# Patient Record
Sex: FEMALE | Race: WHITE | NOT HISPANIC OR LATINO | URBAN - METROPOLITAN AREA
[De-identification: names, ages, dates, MRNs, and addresses within clinical notes are randomized per-mention and may not be internally consistent; named-entity substitution may affect disease eponyms.]

---

## 2019-08-14 ENCOUNTER — INPATIENT (INPATIENT)
Facility: HOSPITAL | Age: 74
LOS: 1 days | Discharge: HOME | DRG: 390 | End: 2019-08-16
Attending: SURGERY | Admitting: SURGERY
Payer: MEDICARE

## 2019-08-14 VITALS
HEART RATE: 74 BPM | SYSTOLIC BLOOD PRESSURE: 149 MMHG | DIASTOLIC BLOOD PRESSURE: 65 MMHG | RESPIRATION RATE: 25 BRPM | OXYGEN SATURATION: 100 %

## 2019-08-14 DIAGNOSIS — Z90.49 ACQUIRED ABSENCE OF OTHER SPECIFIED PARTS OF DIGESTIVE TRACT: Chronic | ICD-10-CM

## 2019-08-14 DIAGNOSIS — Z90.710 ACQUIRED ABSENCE OF BOTH CERVIX AND UTERUS: Chronic | ICD-10-CM

## 2019-08-14 LAB
ALBUMIN SERPL ELPH-MCNC: 3.6 G/DL — SIGNIFICANT CHANGE UP (ref 3.3–5)
ALBUMIN SERPL ELPH-MCNC: 4.2 G/DL — SIGNIFICANT CHANGE UP (ref 3.3–5)
ALP SERPL-CCNC: 55 U/L — SIGNIFICANT CHANGE UP (ref 40–120)
ALP SERPL-CCNC: 68 U/L — SIGNIFICANT CHANGE UP (ref 40–120)
ALT FLD-CCNC: 20 U/L — SIGNIFICANT CHANGE UP (ref 10–45)
ALT FLD-CCNC: SIGNIFICANT CHANGE UP U/L (ref 10–45)
ANION GAP SERPL CALC-SCNC: 11 MMOL/L — SIGNIFICANT CHANGE UP (ref 5–17)
ANION GAP SERPL CALC-SCNC: 14 MMOL/L — SIGNIFICANT CHANGE UP (ref 5–17)
APPEARANCE UR: CLEAR — SIGNIFICANT CHANGE UP
AST SERPL-CCNC: 25 U/L — SIGNIFICANT CHANGE UP (ref 10–40)
AST SERPL-CCNC: SIGNIFICANT CHANGE UP U/L (ref 10–40)
BASOPHILS # BLD AUTO: 0.02 K/UL — SIGNIFICANT CHANGE UP (ref 0–0.2)
BASOPHILS NFR BLD AUTO: 0.2 % — SIGNIFICANT CHANGE UP (ref 0–2)
BILIRUB SERPL-MCNC: 0.3 MG/DL — SIGNIFICANT CHANGE UP (ref 0.2–1.2)
BILIRUB SERPL-MCNC: 0.4 MG/DL — SIGNIFICANT CHANGE UP (ref 0.2–1.2)
BILIRUB UR-MCNC: NEGATIVE — SIGNIFICANT CHANGE UP
BUN SERPL-MCNC: 19 MG/DL — SIGNIFICANT CHANGE UP (ref 7–23)
BUN SERPL-MCNC: 21 MG/DL — SIGNIFICANT CHANGE UP (ref 7–23)
CALCIUM SERPL-MCNC: 8.6 MG/DL — SIGNIFICANT CHANGE UP (ref 8.4–10.5)
CALCIUM SERPL-MCNC: 9.6 MG/DL — SIGNIFICANT CHANGE UP (ref 8.4–10.5)
CHLORIDE SERPL-SCNC: 101 MMOL/L — SIGNIFICANT CHANGE UP (ref 96–108)
CHLORIDE SERPL-SCNC: 102 MMOL/L — SIGNIFICANT CHANGE UP (ref 96–108)
CO2 SERPL-SCNC: 28 MMOL/L — SIGNIFICANT CHANGE UP (ref 22–31)
CO2 SERPL-SCNC: 30 MMOL/L — SIGNIFICANT CHANGE UP (ref 22–31)
COLOR SPEC: YELLOW — SIGNIFICANT CHANGE UP
CREAT SERPL-MCNC: 0.62 MG/DL — SIGNIFICANT CHANGE UP (ref 0.5–1.3)
CREAT SERPL-MCNC: 0.63 MG/DL — SIGNIFICANT CHANGE UP (ref 0.5–1.3)
DIFF PNL FLD: NEGATIVE — SIGNIFICANT CHANGE UP
EOSINOPHIL # BLD AUTO: 0.45 K/UL — SIGNIFICANT CHANGE UP (ref 0–0.5)
EOSINOPHIL NFR BLD AUTO: 3.5 % — SIGNIFICANT CHANGE UP (ref 0–6)
GLUCOSE SERPL-MCNC: 106 MG/DL — HIGH (ref 70–99)
GLUCOSE SERPL-MCNC: 124 MG/DL — HIGH (ref 70–99)
GLUCOSE UR QL: NEGATIVE — SIGNIFICANT CHANGE UP
HCT VFR BLD CALC: 42.9 % — SIGNIFICANT CHANGE UP (ref 34.5–45)
HGB BLD-MCNC: 14 G/DL — SIGNIFICANT CHANGE UP (ref 11.5–15.5)
IMM GRANULOCYTES NFR BLD AUTO: 0.5 % — SIGNIFICANT CHANGE UP (ref 0–1.5)
KETONES UR-MCNC: 15 MG/DL
LACTATE SERPL-SCNC: 1.9 MMOL/L — SIGNIFICANT CHANGE UP (ref 0.5–2)
LEUKOCYTE ESTERASE UR-ACNC: NEGATIVE — SIGNIFICANT CHANGE UP
LIDOCAIN IGE QN: 14 U/L — SIGNIFICANT CHANGE UP (ref 7–60)
LYMPHOCYTES # BLD AUTO: 0.76 K/UL — LOW (ref 1–3.3)
LYMPHOCYTES # BLD AUTO: 5.9 % — LOW (ref 13–44)
MCHC RBC-ENTMCNC: 30 PG — SIGNIFICANT CHANGE UP (ref 27–34)
MCHC RBC-ENTMCNC: 32.6 GM/DL — SIGNIFICANT CHANGE UP (ref 32–36)
MCV RBC AUTO: 91.9 FL — SIGNIFICANT CHANGE UP (ref 80–100)
MONOCYTES # BLD AUTO: 0.74 K/UL — SIGNIFICANT CHANGE UP (ref 0–0.9)
MONOCYTES NFR BLD AUTO: 5.7 % — SIGNIFICANT CHANGE UP (ref 2–14)
NEUTROPHILS # BLD AUTO: 10.89 K/UL — HIGH (ref 1.8–7.4)
NEUTROPHILS NFR BLD AUTO: 84.2 % — HIGH (ref 43–77)
NITRITE UR-MCNC: NEGATIVE — SIGNIFICANT CHANGE UP
NRBC # BLD: 0 /100 WBCS — SIGNIFICANT CHANGE UP (ref 0–0)
PH UR: 8.5 — HIGH (ref 5–8)
PLATELET # BLD AUTO: 261 K/UL — SIGNIFICANT CHANGE UP (ref 150–400)
POTASSIUM SERPL-MCNC: 3.9 MMOL/L — SIGNIFICANT CHANGE UP (ref 3.5–5.3)
POTASSIUM SERPL-MCNC: SIGNIFICANT CHANGE UP MMOL/L (ref 3.5–5.3)
POTASSIUM SERPL-SCNC: 3.9 MMOL/L — SIGNIFICANT CHANGE UP (ref 3.5–5.3)
POTASSIUM SERPL-SCNC: SIGNIFICANT CHANGE UP MMOL/L (ref 3.5–5.3)
PROT SERPL-MCNC: 5.8 G/DL — LOW (ref 6–8.3)
PROT SERPL-MCNC: 6.8 G/DL — SIGNIFICANT CHANGE UP (ref 6–8.3)
PROT UR-MCNC: 30 MG/DL
RBC # BLD: 4.67 M/UL — SIGNIFICANT CHANGE UP (ref 3.8–5.2)
RBC # FLD: 13.6 % — SIGNIFICANT CHANGE UP (ref 10.3–14.5)
SODIUM SERPL-SCNC: 143 MMOL/L — SIGNIFICANT CHANGE UP (ref 135–145)
SODIUM SERPL-SCNC: 143 MMOL/L — SIGNIFICANT CHANGE UP (ref 135–145)
SP GR SPEC: 1.01 — SIGNIFICANT CHANGE UP (ref 1–1.03)
UROBILINOGEN FLD QL: 0.2 E.U./DL — SIGNIFICANT CHANGE UP
WBC # BLD: 12.93 K/UL — HIGH (ref 3.8–10.5)
WBC # FLD AUTO: 12.93 K/UL — HIGH (ref 3.8–10.5)

## 2019-08-14 PROCEDURE — 99291 CRITICAL CARE FIRST HOUR: CPT | Mod: 25

## 2019-08-14 PROCEDURE — 71045 X-RAY EXAM CHEST 1 VIEW: CPT | Mod: 26

## 2019-08-14 PROCEDURE — 74019 RADEX ABDOMEN 2 VIEWS: CPT | Mod: 26

## 2019-08-14 PROCEDURE — 74177 CT ABD & PELVIS W/CONTRAST: CPT | Mod: 26

## 2019-08-14 RX ORDER — ONDANSETRON 8 MG/1
4 TABLET, FILM COATED ORAL ONCE
Refills: 0 | Status: DISCONTINUED | OUTPATIENT
Start: 2019-08-14 | End: 2019-08-14

## 2019-08-14 RX ORDER — SODIUM CHLORIDE 9 MG/ML
1000 INJECTION, SOLUTION INTRAVENOUS
Refills: 0 | Status: DISCONTINUED | OUTPATIENT
Start: 2019-08-14 | End: 2019-08-16

## 2019-08-14 RX ORDER — ONDANSETRON 8 MG/1
4 TABLET, FILM COATED ORAL ONCE
Refills: 0 | Status: COMPLETED | OUTPATIENT
Start: 2019-08-14 | End: 2019-08-14

## 2019-08-14 RX ORDER — RANITIDINE HYDROCHLORIDE 150 MG/1
0 TABLET, FILM COATED ORAL
Qty: 0 | Refills: 0 | DISCHARGE

## 2019-08-14 RX ORDER — METOCLOPRAMIDE HCL 10 MG
10 TABLET ORAL ONCE
Refills: 0 | Status: COMPLETED | OUTPATIENT
Start: 2019-08-14 | End: 2019-08-14

## 2019-08-14 RX ORDER — SODIUM CHLORIDE 9 MG/ML
1000 INJECTION INTRAMUSCULAR; INTRAVENOUS; SUBCUTANEOUS ONCE
Refills: 0 | Status: COMPLETED | OUTPATIENT
Start: 2019-08-14 | End: 2019-08-14

## 2019-08-14 RX ORDER — OXYCODONE HYDROCHLORIDE 5 MG/1
0 TABLET ORAL
Qty: 0 | Refills: 0 | DISCHARGE

## 2019-08-14 RX ORDER — MORPHINE SULFATE 50 MG/1
2 CAPSULE, EXTENDED RELEASE ORAL ONCE
Refills: 0 | Status: DISCONTINUED | OUTPATIENT
Start: 2019-08-14 | End: 2019-08-14

## 2019-08-14 RX ORDER — MORPHINE SULFATE 50 MG/1
4 CAPSULE, EXTENDED RELEASE ORAL ONCE
Refills: 0 | Status: DISCONTINUED | OUTPATIENT
Start: 2019-08-14 | End: 2019-08-14

## 2019-08-14 RX ORDER — FAMOTIDINE 10 MG/ML
20 INJECTION INTRAVENOUS DAILY
Refills: 0 | Status: DISCONTINUED | OUTPATIENT
Start: 2019-08-14 | End: 2019-08-16

## 2019-08-14 RX ORDER — IOHEXOL 300 MG/ML
30 INJECTION, SOLUTION INTRAVENOUS ONCE
Refills: 0 | Status: COMPLETED | OUTPATIENT
Start: 2019-08-14 | End: 2019-08-14

## 2019-08-14 RX ORDER — HALOPERIDOL DECANOATE 100 MG/ML
2 INJECTION INTRAMUSCULAR ONCE
Refills: 0 | Status: COMPLETED | OUTPATIENT
Start: 2019-08-14 | End: 2019-08-14

## 2019-08-14 RX ORDER — ENOXAPARIN SODIUM 100 MG/ML
40 INJECTION SUBCUTANEOUS EVERY 24 HOURS
Refills: 0 | Status: DISCONTINUED | OUTPATIENT
Start: 2019-08-14 | End: 2019-08-16

## 2019-08-14 RX ORDER — SERTRALINE 25 MG/1
0 TABLET, FILM COATED ORAL
Qty: 0 | Refills: 0 | DISCHARGE

## 2019-08-14 RX ORDER — METHYLPHENIDATE HCL 5 MG
0 TABLET ORAL
Qty: 0 | Refills: 0 | DISCHARGE

## 2019-08-14 RX ADMIN — Medication 204 MILLIGRAM(S): at 13:53

## 2019-08-14 RX ADMIN — MORPHINE SULFATE 2 MILLIGRAM(S): 50 CAPSULE, EXTENDED RELEASE ORAL at 10:20

## 2019-08-14 RX ADMIN — SODIUM CHLORIDE 1500 MILLILITER(S): 9 INJECTION INTRAMUSCULAR; INTRAVENOUS; SUBCUTANEOUS at 10:00

## 2019-08-14 RX ADMIN — IOHEXOL 30 MILLILITER(S): 300 INJECTION, SOLUTION INTRAVENOUS at 10:57

## 2019-08-14 RX ADMIN — ENOXAPARIN SODIUM 40 MILLIGRAM(S): 100 INJECTION SUBCUTANEOUS at 19:36

## 2019-08-14 RX ADMIN — ONDANSETRON 4 MILLIGRAM(S): 8 TABLET, FILM COATED ORAL at 10:20

## 2019-08-14 RX ADMIN — MORPHINE SULFATE 4 MILLIGRAM(S): 50 CAPSULE, EXTENDED RELEASE ORAL at 10:00

## 2019-08-14 RX ADMIN — MORPHINE SULFATE 4 MILLIGRAM(S): 50 CAPSULE, EXTENDED RELEASE ORAL at 10:30

## 2019-08-14 RX ADMIN — HALOPERIDOL DECANOATE 2 MILLIGRAM(S): 100 INJECTION INTRAMUSCULAR at 15:38

## 2019-08-14 RX ADMIN — ONDANSETRON 4 MILLIGRAM(S): 8 TABLET, FILM COATED ORAL at 10:00

## 2019-08-14 RX ADMIN — MORPHINE SULFATE 2 MILLIGRAM(S): 50 CAPSULE, EXTENDED RELEASE ORAL at 10:50

## 2019-08-14 RX ADMIN — Medication 104 MILLIGRAM(S): at 12:16

## 2019-08-14 RX ADMIN — SODIUM CHLORIDE 100 MILLILITER(S): 9 INJECTION, SOLUTION INTRAVENOUS at 19:37

## 2019-08-14 RX ADMIN — MORPHINE SULFATE 2 MILLIGRAM(S): 50 CAPSULE, EXTENDED RELEASE ORAL at 16:34

## 2019-08-14 RX ADMIN — FAMOTIDINE 20 MILLIGRAM(S): 10 INJECTION INTRAVENOUS at 19:37

## 2019-08-14 RX ADMIN — MORPHINE SULFATE 2 MILLIGRAM(S): 50 CAPSULE, EXTENDED RELEASE ORAL at 16:04

## 2019-08-14 NOTE — ED PROVIDER NOTE - ATTENDING CONTRIBUTION TO CARE
75 y/o f with h/o endometrial cancer ,  gastric ulcer in the past, OCD, colon resection due to obstruction, rectal prolapse present to ED c/o severe nausea, vomiting and epigastric abd pain. Patient report of waking up this morning with these symptoms. LBM was yesterday but denies passing flatus. Denies fever, URI, sob, chest pain, bloody stools, dysuria. Past surgeries colon section was done in [facility] several years ago and h/o hysterectomy 1994. Admit of taking two percocet today for a leg pain that she has had for two weeks.    Agree with HPI and PE as documented by PA    Labs, ct done in ED with high grade SBO  Pt pain controlled with pain meds and antiemetics in ED  Seen by surgery who agrees on admission for further mgmt

## 2019-08-14 NOTE — ED PROVIDER NOTE - OBJECTIVE STATEMENT
75 y/o f with h/o endometrial cancer ,  gastric ulcer in the past, OCD, colon resection due to obstruction, rectal prolapse present to ED c/o severe nausea, vomiting and epigastric abd pain. Patient report of waking up this morning with these symptoms. LBM was yesterday but denies passing flatus. Denies fever, URI, sob, chest pain, bloody stools, dysuria. Past surgeries colon section was done in [facility] several years ago and h/o hysterectomy 1994. Admit of taking two percocet today for a leg pain that she has had for two weeks.

## 2019-08-14 NOTE — ED ADULT NURSE NOTE - CHPI ED NUR SYMPTOMS NEG
no abdominal distension/no diarrhea/no dysuria/no hematuria/no blood in stool/no burning urination/no fever/no chills

## 2019-08-14 NOTE — ED ADULT NURSE REASSESSMENT NOTE - NS ED NURSE REASSESS COMMENT FT1
Pt. c/o nausea and noted vomited x1 , MOISE Cervantes made aware medicated as ordered . Will continue to monitor .

## 2019-08-14 NOTE — CHART NOTE - NSCHARTNOTEFT_GEN_A_CORE
Serial abdominal exam:   74 F PMH Endometrial Ca (radiation, CARLOS/BSO 1994, resolved), rectal prolapse s/p repair 12/2016 c/b LBO and sigmoid resection 12/2016), OCD presenting w/ SBO.    Patient is resting comfortably in bed. NGT to LIWS. No n/v. No ROBF as of yet.   On exam, she is A&O x3. Her abdomen is soft, mildly distended, and nontender to palpation with no rebound or guarding.     Will continue to monitor with serial abdominal exams.

## 2019-08-14 NOTE — H&P ADULT - NSHPPHYSICALEXAM_GEN_ALL_CORE
General: Moderate discomfort from nausea, bent over  Neuro: AAOX3, EOMI, PERRLA, no scleral icterus  Pulm: CTAB, Nonlabored breathing  CV: S1/S2 normal, no murmurs  Abdomen: soft, mild distention, mild diffuse tenderness-unable to localize, no rebound, no guarding  Extremities: WWP, No LE edema b/l

## 2019-08-14 NOTE — H&P ADULT - NSHPLABSRESULTS_GEN_ALL_CORE
14.0   12.93 )-----------( 261      ( 14 Aug 2019 10:21 )             42.9   08-14    143  |  102  |  19  ----------------------------<  106<H>  3.9   |  30  |  0.63    Ca    8.6      14 Aug 2019 12:47    TPro  5.8<L>  /  Alb  3.6  /  TBili  0.3  /  DBili  x   /  AST  25  /  ALT  20  /  AlkPhos  55  08-14    < from: CT Abdomen and Pelvis w/ Oral Cont and w/ IV Cont (08.14.19 @ 13:20) >    EXAM:  CT ABDOMEN AND PELVIS OC IC                          PROCEDURE DATE:  08/14/2019          INTERPRETATION:  CT SCAN OF ABDOMEN AND PELVIS    History: Abdominal pain and nausea; history of endometrial cancer s/p CARLOS   and BSO    Technique: CT scan of abdomen and pelvis was performed from lung bases   through symphysis pubis. Intravenous and oral contrast material were   utilized. Axial, sagittal and coronal reformatted images were reviewed.    Comparison: None.    Findings:     Lower chest: Normal.     Liver:  Subcentimeter hypodense lesion within segment 7 of the liver   which is too small to characterize.    Gallbladder: The gallbladder is present. No radiopaque stones within the   gallbladder.    Spleen:  Normal.    Pancreas:  Normal.    Adrenal glands:  Normal.    Kidneys: Normal.    Adenopathy:  No lymphadenopathy in abdomen or pelvis.    Ascites: There is trace ascites present    Gastrointestinal tract: Oral contrast has progressed to the proximal   ileum. There is a small bowel obstruction present, likely at the distal   ileum with a transition point in the right inferior hemipelvis, image 69   series 3. Stricture versus adhesion. Unknown if patient has had pelvic   irradiation.. Dilated small bowel measures up to 3.2 cmin diameter.   There is fecalization of the small bowel contents with a moderate stool   burden. No significant air fluid levels or bowel wall thickening. No   pneumatosis. Normal appendix. Probably status post high rectal resection.   Collapsed distal ileal loops.    Vessels: Mild atherosclerosis of the abdominal aorta.    Pelvic organs: The bladder is mildly distended. The uterus and ovaries   have been removed, a vaginal cuff remains. No pelvic adenopathy.    Soft tissues: Normal.    Bones: Mild scoliosis of the lumbar spine. There is multilevel   degenerative disc disease within the lumbar spine. There is a focal bone   island on the left iliac bone adjacent to the acetabulum.    Impression:   1. Findings consistent with high-grade distalsmall bowel obstruction.  2. This can be correlated with a delayed scan to assess for further   passage of contrast agent through the small bowel.  3. Status post CARLOS and BSO.  4. Small amount of pelvic ascites.            Thank you for the opportunity to participate in the care of this patient.    DYLAN KIRKPATRICK M.D., RADIOLOGY RESIDENT  This document has been electronically signed.  JACKY ALCANTAR M.D., ATTENDING RADIOLOGIST  This document has been electronically signed. Aug 14 2019  4:10PM         < end of copied text >

## 2019-08-14 NOTE — H&P ADULT - ASSESSMENT
74 F PMH Endometrial Ca (radiation, CARLOS/BSO 1994, resolved), rectal prolapse s/p repair 12/2016 c/b LBO and sigmoid resection 12/2016), OCD presenting w/ SBO    Admit to General Surgery Team 4, Dr. [facility]  NPO/IVF  NGT to LIWS  Serial Abdominal Exams  Lovenox, SCDs for 74 F PMH Endometrial Ca (radiation, CARLOS/BSO 1994, resolved), rectal prolapse s/p repair 12/2016 c/b LBO and sigmoid resection 12/2016), OCD presenting w/ SBO.    Admit to General Surgery Team 4, Dr. [facility]  NPO/IVF  NGT placed in ED, xray confirmation of placement, maintain to LIWS  Serial Abdominal Exams  Lovenox, SCDs for DVT prophylaxis  AM labs  Discussed w/ chief resident and Dr. Kirk

## 2019-08-14 NOTE — ED ADULT NURSE NOTE - OBJECTIVE STATEMENT
patient alert and oriented x 3 came c/o abdominal pain , nausea and vomiting started this am , denies any dysuria , fever nor chills . History of bowel resection and gastric ulcer , on ranitidine . Pt. also taking oxycodone for her leg pain for the past 2 weeks , last time taken this am . had normal bowel movement last night . Seen and examined by MOISE Cervantes , ,medicated as ordered , will continue to monitor .

## 2019-08-14 NOTE — ED ADULT NURSE REASSESSMENT NOTE - NS ED NURSE REASSESS COMMENT FT1
CT scan showing SBO , pt. got evaluated by surgical resident NGT connected to suction wall placed . Tolerated well .

## 2019-08-14 NOTE — ED ADULT NURSE NOTE - NSIMPLEMENTINTERV_GEN_ALL_ED
Implemented All Universal Safety Interventions:  [place] to call system. Call bell, personal items and telephone within reach. Instruct patient to call for assistance. Room bathroom lighting operational. Non-slip footwear when patient is off stretcher. Physically safe environment: no spills, clutter or unnecessary equipment. Stretcher in lowest position, wheels locked, appropriate side rails in place.

## 2019-08-14 NOTE — PATIENT PROFILE ADULT - ANY SIGNIFICANT CHANGE IN ABILITY TO PERFORM THE FOLLOWING ADL SINCE THE ONSET OF PRESENT ILLNESS?
Instructed pt on pre-op instructions, tips for safer surgery, pain management scale, take enalapril with a sip of water the morning of surgery, medical clearance - Dr Orellana and understanding of all instructions given verbalized.
no

## 2019-08-14 NOTE — H&P ADULT - HISTORY OF PRESENT ILLNESS
74 F PMH Endometrial Ca (radiation, CARLOS/BSO 1994, resolved), rectal prolapse s/p repair 12/2016 c/b LBO and sigmoid resection 12/2016), OCD presenting w/ one day of nausea, abdominal pain and emesis. Pt states sx started 8am today with nausea and periumbilical achey pain, nausea progressed prompting pt to self-induce [id number], associated with worsening generalized abdominal pain described as "food stuck from belly to throat". Pt denies Fever/chills, denies chest pain/dyspnea, did not have appetite today, generally tolerates food well. Denies Flatus today, last BM yesterday evening (usually daily), voiding with new onset hesitancy today. Pt has not had similar sx since last obstruction in (12/2016). Last colonoscopy Jan/2019 and normal per pt, last EGD Jan 2018, per pt gastric ulcer had healed. Denies personal or family history of IBD, cancer    PMH: as above plus gastric ulcer. R thigh pain on oxycodone as oupt, pending outpt MRI  PSH: as above  Meds: Ritalin 20 TID, Zoloft 100d, Rantidine, Oxycodone PRN  Allergy: denies  Social; denies smoking, drinks alcohol once a week, denies illicit drug use  Fam: denies

## 2019-08-14 NOTE — ED PROVIDER NOTE - DIAGNOSTIC INTERPRETATION
cxr: no free air, normal silhouette, no infiltrate  Abd: No dilated loops of bowel, + stool pattern, No air fluid levels.

## 2019-08-14 NOTE — ED PROVIDER NOTE - CLINICAL SUMMARY MEDICAL DECISION MAKING FREE TEXT BOX
Patient symptomatic with multiple round of antiemetics and abd pain. Xrays no evidence of SBO. + ct scan for high grade sbo. Surgery was consulted. NG was placed by surgery. Awaiting from surgery attending recommendation for admisison. Patient symptomatic with multiple round of antiemetics and abd pain. Xrays no evidence of SBO. + ct scan for high grade sbo. Surgery was consulted. NG was placed by surgery. Multiple reassessment, medication  and imaging.  Awaiting from surgery attending recommendation for admission.

## 2019-08-15 LAB
ANION GAP SERPL CALC-SCNC: 9 MMOL/L — SIGNIFICANT CHANGE UP (ref 5–17)
APTT BLD: 38.2 SEC — HIGH (ref 27.5–36.3)
BLD GP AB SCN SERPL QL: NEGATIVE — SIGNIFICANT CHANGE UP
BUN SERPL-MCNC: 12 MG/DL — SIGNIFICANT CHANGE UP (ref 7–23)
CALCIUM SERPL-MCNC: 8.3 MG/DL — LOW (ref 8.4–10.5)
CHLORIDE SERPL-SCNC: 106 MMOL/L — SIGNIFICANT CHANGE UP (ref 96–108)
CO2 SERPL-SCNC: 27 MMOL/L — SIGNIFICANT CHANGE UP (ref 22–31)
CREAT SERPL-MCNC: 0.62 MG/DL — SIGNIFICANT CHANGE UP (ref 0.5–1.3)
CULTURE RESULTS: SIGNIFICANT CHANGE UP
GLUCOSE SERPL-MCNC: 96 MG/DL — SIGNIFICANT CHANGE UP (ref 70–99)
HCT VFR BLD CALC: 37.3 % — SIGNIFICANT CHANGE UP (ref 34.5–45)
HCV AB S/CO SERPL IA: 0.09 S/CO — SIGNIFICANT CHANGE UP
HCV AB SERPL-IMP: SIGNIFICANT CHANGE UP
HGB BLD-MCNC: 11.8 G/DL — SIGNIFICANT CHANGE UP (ref 11.5–15.5)
INR BLD: 1.14 — SIGNIFICANT CHANGE UP (ref 0.88–1.16)
MAGNESIUM SERPL-MCNC: 1.8 MG/DL — SIGNIFICANT CHANGE UP (ref 1.6–2.6)
MCHC RBC-ENTMCNC: 29.4 PG — SIGNIFICANT CHANGE UP (ref 27–34)
MCHC RBC-ENTMCNC: 31.6 GM/DL — LOW (ref 32–36)
MCV RBC AUTO: 93 FL — SIGNIFICANT CHANGE UP (ref 80–100)
NRBC # BLD: 0 /100 WBCS — SIGNIFICANT CHANGE UP (ref 0–0)
PHOSPHATE SERPL-MCNC: 3 MG/DL — SIGNIFICANT CHANGE UP (ref 2.5–4.5)
PLATELET # BLD AUTO: 221 K/UL — SIGNIFICANT CHANGE UP (ref 150–400)
POTASSIUM SERPL-MCNC: 3.8 MMOL/L — SIGNIFICANT CHANGE UP (ref 3.5–5.3)
POTASSIUM SERPL-SCNC: 3.8 MMOL/L — SIGNIFICANT CHANGE UP (ref 3.5–5.3)
PROTHROM AB SERPL-ACNC: 12.9 SEC — SIGNIFICANT CHANGE UP (ref 10–12.9)
RBC # BLD: 4.01 M/UL — SIGNIFICANT CHANGE UP (ref 3.8–5.2)
RBC # FLD: 14.1 % — SIGNIFICANT CHANGE UP (ref 10.3–14.5)
RH IG SCN BLD-IMP: NEGATIVE — SIGNIFICANT CHANGE UP
SODIUM SERPL-SCNC: 142 MMOL/L — SIGNIFICANT CHANGE UP (ref 135–145)
SPECIMEN SOURCE: SIGNIFICANT CHANGE UP
WBC # BLD: 8.32 K/UL — SIGNIFICANT CHANGE UP (ref 3.8–10.5)
WBC # FLD AUTO: 8.32 K/UL — SIGNIFICANT CHANGE UP (ref 3.8–10.5)

## 2019-08-15 PROCEDURE — 74019 RADEX ABDOMEN 2 VIEWS: CPT | Mod: 26

## 2019-08-15 RX ORDER — ACETAMINOPHEN 500 MG
650 TABLET ORAL EVERY 6 HOURS
Refills: 0 | Status: DISCONTINUED | OUTPATIENT
Start: 2019-08-15 | End: 2019-08-16

## 2019-08-15 RX ORDER — BENZOCAINE AND MENTHOL 5; 1 G/100ML; G/100ML
1 LIQUID ORAL ONCE
Refills: 0 | Status: COMPLETED | OUTPATIENT
Start: 2019-08-15 | End: 2019-08-15

## 2019-08-15 RX ORDER — BENZOCAINE AND MENTHOL 5; 1 G/100ML; G/100ML
1 LIQUID ORAL THREE TIMES A DAY
Refills: 0 | Status: DISCONTINUED | OUTPATIENT
Start: 2019-08-15 | End: 2019-08-16

## 2019-08-15 RX ORDER — POTASSIUM CHLORIDE 20 MEQ
10 PACKET (EA) ORAL
Refills: 0 | Status: COMPLETED | OUTPATIENT
Start: 2019-08-15 | End: 2019-08-15

## 2019-08-15 RX ORDER — LIDOCAINE 4 G/100G
2 CREAM TOPICAL ONCE
Refills: 0 | Status: COMPLETED | OUTPATIENT
Start: 2019-08-15 | End: 2019-08-15

## 2019-08-15 RX ORDER — MAGNESIUM SULFATE 500 MG/ML
1 VIAL (ML) INJECTION ONCE
Refills: 0 | Status: COMPLETED | OUTPATIENT
Start: 2019-08-15 | End: 2019-08-15

## 2019-08-15 RX ORDER — LANOLIN ALCOHOL/MO/W.PET/CERES
3 CREAM (GRAM) TOPICAL ONCE
Refills: 0 | Status: COMPLETED | OUTPATIENT
Start: 2019-08-15 | End: 2019-08-15

## 2019-08-15 RX ORDER — GABAPENTIN 400 MG/1
300 CAPSULE ORAL ONCE
Refills: 0 | Status: COMPLETED | OUTPATIENT
Start: 2019-08-15 | End: 2019-08-15

## 2019-08-15 RX ORDER — ACETAMINOPHEN 500 MG
1000 TABLET ORAL ONCE
Refills: 0 | Status: COMPLETED | OUTPATIENT
Start: 2019-08-15 | End: 2019-08-15

## 2019-08-15 RX ADMIN — SODIUM CHLORIDE 100 MILLILITER(S): 9 INJECTION, SOLUTION INTRAVENOUS at 22:07

## 2019-08-15 RX ADMIN — BENZOCAINE AND MENTHOL 1 LOZENGE: 5; 1 LIQUID ORAL at 21:16

## 2019-08-15 RX ADMIN — BENZOCAINE AND MENTHOL 1 LOZENGE: 5; 1 LIQUID ORAL at 13:06

## 2019-08-15 RX ADMIN — FAMOTIDINE 20 MILLIGRAM(S): 10 INJECTION INTRAVENOUS at 12:31

## 2019-08-15 RX ADMIN — Medication 400 MILLIGRAM(S): at 14:25

## 2019-08-15 RX ADMIN — Medication 1000 MILLIGRAM(S): at 15:20

## 2019-08-15 RX ADMIN — Medication 650 MILLIGRAM(S): at 21:16

## 2019-08-15 RX ADMIN — BENZOCAINE AND MENTHOL 1 LOZENGE: 5; 1 LIQUID ORAL at 14:11

## 2019-08-15 RX ADMIN — SODIUM CHLORIDE 100 MILLILITER(S): 9 INJECTION, SOLUTION INTRAVENOUS at 10:14

## 2019-08-15 RX ADMIN — Medication 100 MILLIEQUIVALENT(S): at 13:10

## 2019-08-15 RX ADMIN — Medication 100 GRAM(S): at 10:13

## 2019-08-15 RX ADMIN — Medication 100 MILLIEQUIVALENT(S): at 15:04

## 2019-08-15 RX ADMIN — Medication 650 MILLIGRAM(S): at 22:20

## 2019-08-15 RX ADMIN — GABAPENTIN 300 MILLIGRAM(S): 400 CAPSULE ORAL at 22:07

## 2019-08-15 RX ADMIN — Medication 3 MILLIGRAM(S): at 23:40

## 2019-08-15 RX ADMIN — LIDOCAINE 2 PATCH: 4 CREAM TOPICAL at 19:36

## 2019-08-15 RX ADMIN — ENOXAPARIN SODIUM 40 MILLIGRAM(S): 100 INJECTION SUBCUTANEOUS at 19:04

## 2019-08-15 NOTE — PROGRESS NOTE ADULT - ATTENDING COMMENTS
Pain improved. Passed BM x 2  Abd soft, mild dist, NT  NGT working, output nil.    AXR with contrast in right colon    A/P: Resolving pSBO, due to adhesions or possibly radiation enteritis.  1. NGT removed.  2. IV hydration  3. Sips/chips.  4. OOB

## 2019-08-15 NOTE — CHART NOTE - NSCHARTNOTEFT_GEN_A_CORE
Serial abdominal exam:   74 F PMH Endometrial Ca (radiation, CARLOS/BSO 1994, resolved), rectal prolapse s/p repair 12/2016 c/b LBO and sigmoid resection 12/2016), OCD presenting w/ SBO.    Patient is resting comfortably in bed. NGT to LIWS.  On exam, her abdomen is soft, mildly distended, and nontender to palpation in all four quadrants with no rebound or guarding. Exam unchanged.     Will continue to monitor with serial abdominal exams.

## 2019-08-15 NOTE — PROGRESS NOTE ADULT - ASSESSMENT
74 F PMH Endometrial Ca (radiation, CARLOS/BSO 1994, resolved), rectal prolapse s/p repair 12/2016 c/b LBO and sigmoid resection 12/2016), OCD presenting w/ SBO.    NPO/IVF  NGT  Serial Abdominal Exams  Lovenox, SCDs for DVT prophylaxis

## 2019-08-15 NOTE — PROGRESS NOTE ADULT - SUBJECTIVE AND OBJECTIVE BOX
SUBJECTIVE: Patient seen and examined bedside by chief resident. Pain controlled. No nausea or vomiting. No flatus. +BM. has magdalena OOB. 100cc from NGT overnight.    enoxaparin Injectable 40 milliGRAM(s) SubCutaneous every 24 hours    MEDICATIONS  (PRN):      I&O's Detail    14 Aug 2019 07:01  -  15 Aug 2019 07:00  --------------------------------------------------------  IN:    lactated ringers.: 1000 mL  Total IN: 1000 mL    OUT:    Nasoenteral Tube: 100 mL    Voided: 300 mL  Total OUT: 400 mL    Total NET: 600 mL          Vital Signs Last 24 Hrs  T(C): 36.1 (15 Aug 2019 05:38), Max: 37.5 (14 Aug 2019 19:11)  T(F): 96.9 (15 Aug 2019 05:38), Max: 99.5 (14 Aug 2019 19:11)  HR: 69 (15 Aug 2019 05:38) (64 - 86)  BP: 109/67 (15 Aug 2019 05:38) (108/69 - 167/89)  BP(mean): --  RR: 17 (15 Aug 2019 05:38) (17 - 25)  SpO2: 96% (15 Aug 2019 05:38) (96% - 100%)    General: NAD, resting comfortably in bed  C/V: NSR  Pulm: Nonlabored breathing, no respiratory distress  Abd: soft, NT/ND, NGT to LIWS  Extrem: WWP, no edema, SCDs in place    LABS:                        11.8   8.32  )-----------( 221      ( 15 Aug 2019 06:28 )             37.3     08-15    142  |  106  |  12  ----------------------------<  96  3.8   |  27  |  0.62    Ca    8.3<L>      15 Aug 2019 06:28  Phos  3.0     08-15  Mg     1.8     08-15    TPro  5.8<L>  /  Alb  3.6  /  TBili  0.3  /  DBili  x   /  AST  25  /  ALT  20  /  AlkPhos  55  08-14    PT/INR - ( 15 Aug 2019 06:28 )   PT: 12.9 sec;   INR: 1.14          PTT - ( 15 Aug 2019 06:28 )  PTT:38.2 sec  Urinalysis Basic - ( 14 Aug 2019 11:50 )    Color: Yellow / Appearance: Clear / S.010 / pH: x  Gluc: x / Ketone: 15 mg/dL  / Bili: Negative / Urobili: 0.2 E.U./dL   Blood: x / Protein: 30 mg/dL / Nitrite: NEGATIVE   Leuk Esterase: NEGATIVE / RBC: < 5 /HPF / WBC < 5 /HPF   Sq Epi: x / Non Sq Epi: 5-10 /HPF / Bacteria: Present /HPF

## 2019-08-16 VITALS
DIASTOLIC BLOOD PRESSURE: 77 MMHG | SYSTOLIC BLOOD PRESSURE: 123 MMHG | RESPIRATION RATE: 18 BRPM | TEMPERATURE: 97 F | HEART RATE: 83 BPM | OXYGEN SATURATION: 99 %

## 2019-08-16 LAB
ANION GAP SERPL CALC-SCNC: 9 MMOL/L — SIGNIFICANT CHANGE UP (ref 5–17)
BUN SERPL-MCNC: 7 MG/DL — SIGNIFICANT CHANGE UP (ref 7–23)
CALCIUM SERPL-MCNC: 8.6 MG/DL — SIGNIFICANT CHANGE UP (ref 8.4–10.5)
CHLORIDE SERPL-SCNC: 106 MMOL/L — SIGNIFICANT CHANGE UP (ref 96–108)
CO2 SERPL-SCNC: 28 MMOL/L — SIGNIFICANT CHANGE UP (ref 22–31)
CREAT SERPL-MCNC: 0.57 MG/DL — SIGNIFICANT CHANGE UP (ref 0.5–1.3)
GLUCOSE SERPL-MCNC: 82 MG/DL — SIGNIFICANT CHANGE UP (ref 70–99)
HCT VFR BLD CALC: 41.4 % — SIGNIFICANT CHANGE UP (ref 34.5–45)
HGB BLD-MCNC: 13 G/DL — SIGNIFICANT CHANGE UP (ref 11.5–15.5)
MAGNESIUM SERPL-MCNC: 2.1 MG/DL — SIGNIFICANT CHANGE UP (ref 1.6–2.6)
MCHC RBC-ENTMCNC: 29.9 PG — SIGNIFICANT CHANGE UP (ref 27–34)
MCHC RBC-ENTMCNC: 31.4 GM/DL — LOW (ref 32–36)
MCV RBC AUTO: 95.2 FL — SIGNIFICANT CHANGE UP (ref 80–100)
NRBC # BLD: 0 /100 WBCS — SIGNIFICANT CHANGE UP (ref 0–0)
PHOSPHATE SERPL-MCNC: 2.9 MG/DL — SIGNIFICANT CHANGE UP (ref 2.5–4.5)
PLATELET # BLD AUTO: 241 K/UL — SIGNIFICANT CHANGE UP (ref 150–400)
POTASSIUM SERPL-MCNC: 3.6 MMOL/L — SIGNIFICANT CHANGE UP (ref 3.5–5.3)
POTASSIUM SERPL-SCNC: 3.6 MMOL/L — SIGNIFICANT CHANGE UP (ref 3.5–5.3)
RBC # BLD: 4.35 M/UL — SIGNIFICANT CHANGE UP (ref 3.8–5.2)
RBC # FLD: 14 % — SIGNIFICANT CHANGE UP (ref 10.3–14.5)
SODIUM SERPL-SCNC: 143 MMOL/L — SIGNIFICANT CHANGE UP (ref 135–145)
WBC # BLD: 10.47 K/UL — SIGNIFICANT CHANGE UP (ref 3.8–10.5)
WBC # FLD AUTO: 10.47 K/UL — SIGNIFICANT CHANGE UP (ref 3.8–10.5)

## 2019-08-16 PROCEDURE — 96374 THER/PROPH/DIAG INJ IV PUSH: CPT | Mod: XU

## 2019-08-16 PROCEDURE — 86850 RBC ANTIBODY SCREEN: CPT

## 2019-08-16 PROCEDURE — 74019 RADEX ABDOMEN 2 VIEWS: CPT

## 2019-08-16 PROCEDURE — 84100 ASSAY OF PHOSPHORUS: CPT

## 2019-08-16 PROCEDURE — 96375 TX/PRO/DX INJ NEW DRUG ADDON: CPT

## 2019-08-16 PROCEDURE — 85027 COMPLETE CBC AUTOMATED: CPT

## 2019-08-16 PROCEDURE — 85025 COMPLETE CBC W/AUTO DIFF WBC: CPT

## 2019-08-16 PROCEDURE — 85730 THROMBOPLASTIN TIME PARTIAL: CPT

## 2019-08-16 PROCEDURE — 96376 TX/PRO/DX INJ SAME DRUG ADON: CPT

## 2019-08-16 PROCEDURE — 99285 EMERGENCY DEPT VISIT HI MDM: CPT | Mod: 25

## 2019-08-16 PROCEDURE — 83690 ASSAY OF LIPASE: CPT

## 2019-08-16 PROCEDURE — 87086 URINE CULTURE/COLONY COUNT: CPT

## 2019-08-16 PROCEDURE — 71045 X-RAY EXAM CHEST 1 VIEW: CPT

## 2019-08-16 PROCEDURE — 86803 HEPATITIS C AB TEST: CPT

## 2019-08-16 PROCEDURE — 83605 ASSAY OF LACTIC ACID: CPT

## 2019-08-16 PROCEDURE — 86901 BLOOD TYPING SEROLOGIC RH(D): CPT

## 2019-08-16 PROCEDURE — 83735 ASSAY OF MAGNESIUM: CPT

## 2019-08-16 PROCEDURE — 81001 URINALYSIS AUTO W/SCOPE: CPT

## 2019-08-16 PROCEDURE — 86900 BLOOD TYPING SEROLOGIC ABO: CPT

## 2019-08-16 PROCEDURE — 85610 PROTHROMBIN TIME: CPT

## 2019-08-16 PROCEDURE — 36415 COLL VENOUS BLD VENIPUNCTURE: CPT

## 2019-08-16 PROCEDURE — 74177 CT ABD & PELVIS W/CONTRAST: CPT

## 2019-08-16 PROCEDURE — 80048 BASIC METABOLIC PNL TOTAL CA: CPT

## 2019-08-16 PROCEDURE — 80053 COMPREHEN METABOLIC PANEL: CPT

## 2019-08-16 RX ORDER — POTASSIUM PHOSPHATE, MONOBASIC POTASSIUM PHOSPHATE, DIBASIC 236; 224 MG/ML; MG/ML
15 INJECTION, SOLUTION INTRAVENOUS ONCE
Refills: 0 | Status: COMPLETED | OUTPATIENT
Start: 2019-08-16 | End: 2019-08-16

## 2019-08-16 RX ADMIN — Medication 650 MILLIGRAM(S): at 14:40

## 2019-08-16 RX ADMIN — BENZOCAINE AND MENTHOL 1 LOZENGE: 5; 1 LIQUID ORAL at 05:42

## 2019-08-16 RX ADMIN — LIDOCAINE 2 PATCH: 4 CREAM TOPICAL at 07:45

## 2019-08-16 RX ADMIN — FAMOTIDINE 20 MILLIGRAM(S): 10 INJECTION INTRAVENOUS at 11:16

## 2019-08-16 RX ADMIN — Medication 650 MILLIGRAM(S): at 05:42

## 2019-08-16 RX ADMIN — Medication 650 MILLIGRAM(S): at 06:37

## 2019-08-16 RX ADMIN — BENZOCAINE AND MENTHOL 1 LOZENGE: 5; 1 LIQUID ORAL at 13:24

## 2019-08-16 RX ADMIN — POTASSIUM PHOSPHATE, MONOBASIC POTASSIUM PHOSPHATE, DIBASIC 63.75 MILLIMOLE(S): 236; 224 INJECTION, SOLUTION INTRAVENOUS at 10:28

## 2019-08-16 NOTE — DISCHARGE NOTE NURSING/CASE MANAGEMENT/SOCIAL WORK - NSDCDPATPORTLINK_GEN_ALL_CORE
You can access the [facility] Portal, offered by [facility], by registering with the following website: [email or web address]

## 2019-08-16 NOTE — PROGRESS NOTE ADULT - ASSESSMENT
74 F PMH Endometrial Ca (radiation, CARLOS/BSO 1994, resolved), rectal prolapse s/p repair 12/2016 c/b LBO and sigmoid resection 12/2016), OCD presenting w/ SBO, resolving.    NPO sips/chips/IVF, adv to CLD  Serial Abdominal Exams  Lovenox, SCDs, OOBA  AM labs

## 2019-08-16 NOTE — PROGRESS NOTE ADULT - SUBJECTIVE AND OBJECTIVE BOX
SUBJECTIVE: Feeling better, passing flatus/BM, no N/V. Patient seen and examined bedside by chief resident.    enoxaparin Injectable 40 milliGRAM(s) SubCutaneous every 24 hours    MEDICATIONS  (PRN):  acetaminophen   Tablet .. 650 milliGRAM(s) Oral every 6 hours PRN Mild Pain (1 - 3), Moderate Pain (4 - 6)      I&O's Detail    15 Aug 2019 07:01  -  16 Aug 2019 07:00  --------------------------------------------------------  IN:    lactated ringers.: 2200 mL    Oral Fluid: 50 mL    Solution: 200 mL    Solution: 100 mL  Total IN: 2550 mL    OUT:    Voided: 2550 mL  Total OUT: 2550 mL    Total NET: 0 mL          Vital Signs Last 24 Hrs  T(C): 36 (16 Aug 2019 05:37), Max: 37.1 (15 Aug 2019 21:11)  T(F): 96.8 (16 Aug 2019 05:37), Max: 98.8 (15 Aug 2019 21:11)  HR: 70 (16 Aug 2019 05:37) (70 - 85)  BP: 123/75 (16 Aug 2019 05:37) (115/72 - 146/84)  BP(mean): --  RR: 17 (16 Aug 2019 05:37) (15 - 17)  SpO2: 98% (16 Aug 2019 05:37) (96% - 99%)    General: NAD, resting comfortably in bed  C/V: NSR  Pulm: Nonlabored breathing, no respiratory distress  Abd: soft, NT/ND  Extrem:  SCDs in place    LABS:                        13.0   10.47 )-----------( 241      ( 16 Aug 2019 06:20 )             41.4     08-16    143  |  106  |  7   ----------------------------<  82  3.6   |  28  |  0.57    Ca    8.6      16 Aug 2019 06:20  Phos  2.9     08-16  Mg     2.1     08-16    TPro  5.8<L>  /  Alb  3.6  /  TBili  0.3  /  DBili  x   /  AST  25  /  ALT  20  /  AlkPhos  55  08-14    PT/INR - ( 15 Aug 2019 06:28 )   PT: 12.9 sec;   INR: 1.14          PTT - ( 15 Aug 2019 06:28 )  PTT:38.2 sec  Urinalysis Basic - ( 14 Aug 2019 11:50 )    Color: Yellow / Appearance: Clear / S.010 / pH: x  Gluc: x / Ketone: 15 mg/dL  / Bili: Negative / Urobili: 0.2 E.U./dL   Blood: x / Protein: 30 mg/dL / Nitrite: NEGATIVE   Leuk Esterase: NEGATIVE / RBC: < 5 /HPF / WBC < 5 /HPF   Sq Epi: x / Non Sq Epi: 5-10 /HPF / Bacteria: Present /HPF        RADIOLOGY & ADDITIONAL STUDIES:  CT Abdomen and Pelvis w/ Oral Cont and w/ IV Cont:   EXAM:  CT ABDOMEN AND PELVIS OC IC                          PROCEDURE DATE:  2019          INTERPRETATION:  CT SCAN OF ABDOMEN AND PELVIS    History: Abdominal pain and nausea; history of endometrial cancer s/p CARLOS   and BSO    Technique: CT scan of abdomen and pelvis was performed from lung bases   through symphysis pubis. Intravenous and oral contrast material were   utilized. Axial, sagittal and coronal reformatted images were reviewed.    Comparison: None.    Findings:     Lower chest: Normal.     Liver:  Subcentimeter hypodense lesion within segment 7 of the liver   which is too small to characterize.    Gallbladder: The gallbladder is present. No radiopaque stones within the   gallbladder.    Spleen:  Normal.    Pancreas:  Normal.    Adrenal glands:  Normal.    Kidneys: Normal.    Adenopathy:  No lymphadenopathy in abdomen or pelvis.    Ascites: There is trace ascites present    Gastrointestinal tract: Oral contrast has progressed to the proximal   ileum. There is a small bowel obstruction present, likely at the distal   ileum with a transition point in the right inferior hemipelvis, image 69   series 3. Stricture versus adhesion. Unknown if patient has had pelvic   irradiation.. Dilated small bowel measures up to 3.2 cmin diameter.   There is fecalization of the small bowel contents with a moderate stool   burden. No significant air fluid levels or bowel wall thickening. No   pneumatosis. Normal appendix. Probably status post high rectal resection.   Collapsed distal ileal loops.    Vessels: Mild atherosclerosis of the abdominal aorta.    Pelvic organs: The bladder is mildly distended. The uterus and ovaries   have been removed, a vaginal cuff remains. No pelvic adenopathy.    Soft tissues: Normal.    Bones: Mild scoliosis of the lumbar spine. There is multilevel   degenerative disc disease within the lumbar spine. There is a focal bone   island on the left iliac bone adjacent to the acetabulum.    Impression:   1. Findings consistent with high-grade distalsmall bowel obstruction.  2. This can be correlated with a delayed scan to assess for further   passage of contrast agent through the small bowel.  3. Status post CARLOS and BSO.  4. Small amount of pelvic ascites.            Thank you for the opportunity to participate in the care of this patient.    DYLAN KIRKPATRICK M.D., RADIOLOGY RESIDENT  This document has been electronically signed.  JACKY ALCANTAR M.D., ATTENDING RADIOLOGIST  This document has been electronically signed. Aug 14 2019  4:10PM              (19 @ 13:20)

## 2019-08-16 NOTE — DISCHARGE NOTE PROVIDER - CARE PROVIDER_API CALL
Rosalina Rossi)  ColonRectal Surgery; Surgery  [address] [place] [address], [address]  [place] [address]  Phone: [phone]  Fax: [phone]  Follow Up Time:

## 2019-08-16 NOTE — DISCHARGE NOTE PROVIDER - NSDCFUADDINST_GEN_ALL_CORE_FT
-Contact your doctor or go to the ER for return of symptoms, fever > 101.5, chills, nausea, vomiting, chest pain, shortness of breath  -Please follow up with Dr. Rossi in 1-2 weeks; you may call the office to make an appointment at your earliest convenience.

## 2019-08-16 NOTE — DISCHARGE NOTE PROVIDER - HOSPITAL COURSE
74 F PMH Endometrial Ca (radiation, CARLOS/BSO 1994, resolved), rectal prolapse s/p repair 12/2016 c/b LBO and sigmoid resection 12/2016), OCD presenting w/ SBO which was managed nonoperatively and resolved. Patient's hospital course was uncomplicated. Diet was advanced as tolerated and pain was well controlled on medication. On day of discharge, pt deemed stable and ready to return home with plan to follow up as an outpatient.

## 2019-08-26 DIAGNOSIS — F42.9 OBSESSIVE-COMPULSIVE DISORDER, UNSPECIFIED: ICD-10-CM

## 2019-08-26 DIAGNOSIS — K56.609 UNSPECIFIED INTESTINAL OBSTRUCTION, UNSPECIFIED AS TO PARTIAL VERSUS COMPLETE OBSTRUCTION: ICD-10-CM

## 2019-08-26 DIAGNOSIS — R10.9 UNSPECIFIED ABDOMINAL PAIN: ICD-10-CM

## 2019-08-26 DIAGNOSIS — Z90.710 ACQUIRED ABSENCE OF BOTH CERVIX AND UTERUS: ICD-10-CM

## 2019-08-26 DIAGNOSIS — Z90.49 ACQUIRED ABSENCE OF OTHER SPECIFIED PARTS OF DIGESTIVE TRACT: ICD-10-CM

## 2019-08-26 DIAGNOSIS — Z85.42 PERSONAL HISTORY OF MALIGNANT NEOPLASM OF OTHER PARTS OF UTERUS: ICD-10-CM

## 2020-11-16 NOTE — ED PROVIDER NOTE - CPE EDP PSYCH NORM
Per Dr. Leyda Arita, we will not complete overnight trend for Home O2. Pt already has overnight oxygen per nursing to Dr. Eddie Avelar that pt uses as needed.  Thank you normal...